# Patient Record
Sex: FEMALE | Race: WHITE | NOT HISPANIC OR LATINO | URBAN - METROPOLITAN AREA
[De-identification: names, ages, dates, MRNs, and addresses within clinical notes are randomized per-mention and may not be internally consistent; named-entity substitution may affect disease eponyms.]

---

## 2017-10-28 ENCOUNTER — EMERGENCY (EMERGENCY)
Facility: HOSPITAL | Age: 67
LOS: 1 days | Discharge: HOME | End: 2017-10-28
Attending: EMERGENCY MEDICINE
Payer: COMMERCIAL

## 2017-10-28 VITALS
DIASTOLIC BLOOD PRESSURE: 81 MMHG | TEMPERATURE: 98 F | HEART RATE: 110 BPM | RESPIRATION RATE: 18 BRPM | OXYGEN SATURATION: 96 % | SYSTOLIC BLOOD PRESSURE: 130 MMHG

## 2017-10-28 VITALS — WEIGHT: 233.91 LBS | HEIGHT: 64 IN

## 2017-10-28 DIAGNOSIS — Z96.7 PRESENCE OF OTHER BONE AND TENDON IMPLANTS: Chronic | ICD-10-CM

## 2017-10-28 PROCEDURE — 99284 EMERGENCY DEPT VISIT MOD MDM: CPT | Mod: 25

## 2017-10-28 PROCEDURE — 99283 EMERGENCY DEPT VISIT LOW MDM: CPT | Mod: 25

## 2017-10-28 PROCEDURE — 73080 X-RAY EXAM OF ELBOW: CPT | Mod: 26,RT

## 2017-10-28 PROCEDURE — 29105 APPLICATION LONG ARM SPLINT: CPT

## 2017-10-28 PROCEDURE — 29105 APPLICATION LONG ARM SPLINT: CPT | Mod: RT

## 2017-10-28 PROCEDURE — 73080 X-RAY EXAM OF ELBOW: CPT

## 2017-10-28 RX ORDER — IBUPROFEN 200 MG
1 TABLET ORAL
Qty: 15 | Refills: 0 | OUTPATIENT
Start: 2017-10-28 | End: 2017-11-02

## 2017-10-28 RX ORDER — LISINOPRIL 2.5 MG/1
0 TABLET ORAL
Qty: 0 | Refills: 0 | COMMUNITY

## 2017-10-28 NOTE — ED STATDOCS - OBJECTIVE STATEMENT
CC: R arm pain  Presenting symptoms: 66 y/o F presents to ED c/o R elbow pain onset today s/p mechanical fall. Pt fell on cement today s/p tripping on the curb; she stopped her fall with the FOOSH mechanism; she then turned over on her R arm. Denies smoking, and very little ETOH use.   Pertinent Positives: +R arm pain  Pertinent Negatives: Denies fever, chills, N/V/D, HA, weakness, numbness, back pain, neck pain, shoulder pain  Timing: today  Quality: achy  Radiation: none  Severity: mild  Aggravating Factors: TTP  Relieving Factors: none

## 2017-10-28 NOTE — ED STATDOCS - PHYSICAL EXAMINATION
Constitutional: Alert, NAD.   ENT: Airway patent. Nose clear. Mouth with normal mucosa.   Head: Atraumatic.   Eyes: Clear bilaterally. PERRL.   Cardiac: Normal rate.   Respiratory: Breath sounds clear bilaterally.   GI: Abdomen soft, non-tender, no guarding.   : No CVA or bladder tenderness.   Musculoskeletal: FROM. tenderness to R elbow.  Neuro: alert and oriented, no focal deficits, no motor or sensory deficits.   Skin: Dry, intact, no rash.   Psych: normal mood and affect.

## 2017-10-28 NOTE — ED STATDOCS - ATTENDING CONTRIBUTION TO CARE
I, Da Donohue, performed the initial face to face bedside interview with this patient regarding history of present illness, review of symptoms and relevant past medical, social and family history.  I completed an independent physical examination.  I was the initial provider who evaluated this patient. I have signed out the follow up of any pending tests (i.e. labs, radiological studies) to the ACP.  I have communicated the patient’s plan of care and disposition with the ACP.

## 2017-10-28 NOTE — ED STATDOCS - NS ED ROS FT
+R elbow pain  no fever  no chest pain  no SOB  no abd pain  no HA  All other ROS negative except as per HPI

## 2017-10-28 NOTE — ED STATDOCS - PROGRESS NOTE DETAILS
NP NOTE:  68 y/o F tripped and FOOSH at 12 N.  She presents with c/o right elbow pain.  On Exam pain to distal humerus on palpation, full BHAVANA of elbow.  Await x-rays. NP NOTE:  x-ray with sail sign and anterior fat pad, posterior splint applied, RICE, ibuprofen and f/u Dr. Azul

## 2017-11-02 ENCOUNTER — APPOINTMENT (OUTPATIENT)
Dept: ORTHOPEDIC SURGERY | Facility: CLINIC | Age: 67
End: 2017-11-02
Payer: COMMERCIAL

## 2017-11-02 VITALS
SYSTOLIC BLOOD PRESSURE: 134 MMHG | BODY MASS INDEX: 40.46 KG/M2 | HEIGHT: 64 IN | WEIGHT: 237 LBS | HEART RATE: 98 BPM | DIASTOLIC BLOOD PRESSURE: 73 MMHG

## 2017-11-02 DIAGNOSIS — S52.124D NONDISPLACED FRACTURE OF HEAD OF RIGHT RADIUS, SUBSEQUENT ENCOUNTER FOR CLOSED FRACTURE WITH ROUTINE HEALING: ICD-10-CM

## 2017-11-02 PROCEDURE — 73080 X-RAY EXAM OF ELBOW: CPT | Mod: RT

## 2017-11-02 PROCEDURE — 24650 CLTX RDL HEAD/NCK FX WO MNPJ: CPT | Mod: RT

## 2017-11-02 PROCEDURE — 99203 OFFICE O/P NEW LOW 30 MIN: CPT | Mod: 57

## 2017-11-10 ENCOUNTER — APPOINTMENT (OUTPATIENT)
Dept: ORTHOPEDIC SURGERY | Facility: CLINIC | Age: 67
End: 2017-11-10

## 2017-11-10 VITALS
WEIGHT: 237 LBS | HEIGHT: 64 IN | SYSTOLIC BLOOD PRESSURE: 133 MMHG | BODY MASS INDEX: 40.46 KG/M2 | DIASTOLIC BLOOD PRESSURE: 85 MMHG | HEART RATE: 103 BPM

## 2017-11-24 ENCOUNTER — APPOINTMENT (OUTPATIENT)
Dept: ORTHOPEDIC SURGERY | Facility: CLINIC | Age: 67
End: 2017-11-24
Payer: COMMERCIAL

## 2017-11-24 VITALS
TEMPERATURE: 98.3 F | HEART RATE: 91 BPM | HEIGHT: 64 IN | WEIGHT: 235 LBS | BODY MASS INDEX: 40.12 KG/M2 | SYSTOLIC BLOOD PRESSURE: 144 MMHG | DIASTOLIC BLOOD PRESSURE: 83 MMHG

## 2017-11-24 DIAGNOSIS — S42.401A UNSPECIFIED FRACTURE OF LOWER END OF RIGHT HUMERUS, INITIAL ENCOUNTER FOR CLOSED FRACTURE: ICD-10-CM

## 2017-11-24 PROCEDURE — 73080 X-RAY EXAM OF ELBOW: CPT | Mod: RT

## 2017-11-24 PROCEDURE — 99024 POSTOP FOLLOW-UP VISIT: CPT

## 2017-11-24 RX ORDER — CYANOCOBALAMIN 1000 UG/ML
1000 INJECTION INTRAMUSCULAR; SUBCUTANEOUS
Qty: 1 | Refills: 0 | Status: ACTIVE | COMMUNITY
Start: 2017-06-08

## 2017-11-24 RX ORDER — FLUTICASONE PROPIONATE 50 UG/1
50 SPRAY, METERED NASAL
Qty: 16 | Refills: 0 | Status: ACTIVE | COMMUNITY
Start: 2017-10-03

## 2017-11-24 RX ORDER — PREDNISONE 10 MG/1
10 TABLET ORAL
Qty: 15 | Refills: 0 | Status: ACTIVE | COMMUNITY
Start: 2017-10-13

## 2017-11-24 RX ORDER — LISINOPRIL 30 MG/1
TABLET ORAL
Refills: 0 | Status: ACTIVE | COMMUNITY

## 2017-11-24 RX ORDER — AZITHROMYCIN 250 MG/1
250 TABLET, FILM COATED ORAL
Qty: 6 | Refills: 0 | Status: ACTIVE | COMMUNITY
Start: 2017-10-03

## 2017-11-24 RX ORDER — CEFDINIR 300 MG/1
300 CAPSULE ORAL
Qty: 20 | Refills: 0 | Status: ACTIVE | COMMUNITY
Start: 2017-10-13

## 2017-11-25 PROBLEM — S42.401A CLOSED FRACTURE OF RIGHT ELBOW, INITIAL ENCOUNTER: Status: ACTIVE | Noted: 2017-11-02

## 2017-11-25 PROBLEM — S52.124D CLOSED NONDISPLACED FRACTURE OF HEAD OF RIGHT RADIUS WITH ROUTINE HEALING, SUBSEQUENT ENCOUNTER: Status: ACTIVE | Noted: 2017-11-25

## 2017-12-14 ENCOUNTER — APPOINTMENT (OUTPATIENT)
Dept: ORTHOPEDIC SURGERY | Facility: CLINIC | Age: 67
End: 2017-12-14

## 2018-01-25 ENCOUNTER — APPOINTMENT (OUTPATIENT)
Dept: ORTHOPEDIC SURGERY | Facility: CLINIC | Age: 68
End: 2018-01-25

## 2018-10-12 PROBLEM — I10 ESSENTIAL (PRIMARY) HYPERTENSION: Chronic | Status: ACTIVE | Noted: 2017-10-28

## 2018-11-30 ENCOUNTER — APPOINTMENT (OUTPATIENT)
Dept: OBGYN | Facility: CLINIC | Age: 68
End: 2018-11-30
Payer: MEDICARE

## 2018-11-30 VITALS
HEIGHT: 64 IN | SYSTOLIC BLOOD PRESSURE: 125 MMHG | BODY MASS INDEX: 40.8 KG/M2 | DIASTOLIC BLOOD PRESSURE: 80 MMHG | WEIGHT: 239 LBS

## 2018-11-30 PROCEDURE — 82270 OCCULT BLOOD FECES: CPT

## 2018-11-30 PROCEDURE — G0101: CPT

## 2018-12-06 LAB — CYTOLOGY CVX/VAG DOC THIN PREP: NORMAL

## 2020-12-15 ENCOUNTER — APPOINTMENT (OUTPATIENT)
Dept: OBGYN | Facility: CLINIC | Age: 70
End: 2020-12-15
Payer: MEDICARE

## 2020-12-15 VITALS
WEIGHT: 256 LBS | BODY MASS INDEX: 43.71 KG/M2 | HEIGHT: 64 IN | DIASTOLIC BLOOD PRESSURE: 85 MMHG | SYSTOLIC BLOOD PRESSURE: 163 MMHG

## 2020-12-15 PROCEDURE — 82270 OCCULT BLOOD FECES: CPT

## 2020-12-15 PROCEDURE — G0101: CPT

## 2020-12-21 LAB — CYTOLOGY CVX/VAG DOC THIN PREP: ABNORMAL

## 2022-03-10 ENCOUNTER — APPOINTMENT (OUTPATIENT)
Dept: OBGYN | Facility: CLINIC | Age: 72
End: 2022-03-10
Payer: MEDICARE

## 2022-03-10 VITALS
HEIGHT: 64 IN | WEIGHT: 247 LBS | SYSTOLIC BLOOD PRESSURE: 140 MMHG | BODY MASS INDEX: 42.17 KG/M2 | DIASTOLIC BLOOD PRESSURE: 80 MMHG

## 2022-03-10 DIAGNOSIS — Z01.419 ENCOUNTER FOR GYNECOLOGICAL EXAMINATION (GENERAL) (ROUTINE) W/OUT ABNORMAL FINDINGS: ICD-10-CM

## 2022-03-10 DIAGNOSIS — Z12.39 ENCOUNTER FOR OTHER SCREENING FOR MALIGNANT NEOPLASM OF BREAST: ICD-10-CM

## 2022-03-10 PROCEDURE — 99213 OFFICE O/P EST LOW 20 MIN: CPT

## 2022-03-10 NOTE — HISTORY OF PRESENT ILLNESS
[FreeTextEntry1] : 72 yo pt here for gyn visit. wants screening for breast ca. denies vb, pel pain. \par dexa showed osteopenia.

## 2022-03-10 NOTE — PHYSICAL EXAM
[Appropriately responsive] : appropriately responsive [Alert] : alert [No Acute Distress] : no acute distress [No Lymphadenopathy] : no lymphadenopathy [Regular Rate Rhythm] : regular rate rhythm [No Murmurs] : no murmurs [Clear to Auscultation B/L] : clear to auscultation bilaterally [Soft] : soft [Non-tender] : non-tender [Non-distended] : non-distended [No HSM] : No HSM [No Lesions] : no lesions [No Mass] : no mass [Oriented x3] : oriented x3 [Examination Of The Breasts] : a normal appearance [No Masses] : no breast masses were palpable [Labia Majora] : normal [Labia Minora] : normal [Normal] : normal [Uterine Adnexae] : normal [No Tenderness] : no tenderness [FreeTextEntry9] : erik

## 2022-03-10 NOTE — DISCUSSION/SUMMARY
[FreeTextEntry1] : osteopenia- guerita, vit d , exercize. \par nl gyn exam, ref mammo\par spent 25 min in encounter.

## 2022-08-08 ENCOUNTER — NON-APPOINTMENT (OUTPATIENT)
Age: 72
End: 2022-08-08

## 2023-04-25 ENCOUNTER — APPOINTMENT (OUTPATIENT)
Dept: OBGYN | Facility: CLINIC | Age: 73
End: 2023-04-25
Payer: MEDICARE

## 2023-04-25 ENCOUNTER — NON-APPOINTMENT (OUTPATIENT)
Age: 73
End: 2023-04-25

## 2023-04-25 VITALS
SYSTOLIC BLOOD PRESSURE: 135 MMHG | DIASTOLIC BLOOD PRESSURE: 83 MMHG | BODY MASS INDEX: 42.17 KG/M2 | HEIGHT: 64 IN | WEIGHT: 247 LBS

## 2023-04-25 DIAGNOSIS — Z00.00 ENCOUNTER FOR GENERAL ADULT MEDICAL EXAMINATION W/OUT ABNORMAL FINDINGS: ICD-10-CM

## 2023-04-25 PROCEDURE — G0101: CPT

## 2023-04-25 NOTE — HISTORY OF PRESENT ILLNESS
[FreeTextEntry1] : 73 yo pt here for gyn visit. wants screening for breast ca. denies vb, pel pain. \par dexa showed osteopenia.

## 2023-05-03 LAB — CYTOLOGY CVX/VAG DOC THIN PREP: ABNORMAL

## 2024-03-22 ENCOUNTER — OFFICE (OUTPATIENT)
Dept: URBAN - METROPOLITAN AREA CLINIC 113 | Facility: CLINIC | Age: 74
Setting detail: OPHTHALMOLOGY
End: 2024-03-22
Payer: MEDICARE

## 2024-03-22 DIAGNOSIS — H25.13: ICD-10-CM

## 2024-03-22 DIAGNOSIS — H43.811: ICD-10-CM

## 2024-03-22 DIAGNOSIS — H17.9: ICD-10-CM

## 2024-03-22 DIAGNOSIS — H25.011: ICD-10-CM

## 2024-03-22 DIAGNOSIS — H40.013: ICD-10-CM

## 2024-03-22 PROCEDURE — 92014 COMPRE OPH EXAM EST PT 1/>: CPT | Performed by: OPHTHALMOLOGY

## 2024-03-22 PROCEDURE — 92250 FUNDUS PHOTOGRAPHY W/I&R: CPT | Performed by: OPHTHALMOLOGY

## 2024-03-22 ASSESSMENT — REFRACTION_CURRENTRX
OD_CYLINDER: -2.25
OS_CYLINDER: -1.25
OS_AXIS: 075
OD_ADD: +2.00
OD_OVR_VA: 20/
OS_OVR_VA: 20/
OS_VPRISM_DIRECTION: PROGS
OD_VPRISM_DIRECTION: PROGS
OD_AXIS: 069
OS_ADD: +2.00
OS_SPHERE: -0.75
OD_SPHERE: +2.25

## 2024-03-22 ASSESSMENT — SPHEQUIV_DERIVED
OD_SPHEQUIV: -2
OS_SPHEQUIV: -0.25

## 2024-03-22 ASSESSMENT — REFRACTION_MANIFEST
OS_SPHERE: +0.50
OD_ADD: +3.00
OD_VA1: 20/40
OS_CYLINDER: -1.50
OD_CYLINDER: -1.00
OS_ADD: +3.00
OS_AXIS: 083
OD_AXIS: 082
OS_VA1: 20/25
OD_SPHERE: -1.50

## 2024-05-17 DIAGNOSIS — Z13.820 ENCOUNTER FOR SCREENING FOR OSTEOPOROSIS: ICD-10-CM

## 2024-05-21 ENCOUNTER — APPOINTMENT (OUTPATIENT)
Dept: OBGYN | Facility: CLINIC | Age: 74
End: 2024-05-21
Payer: MEDICARE

## 2024-05-21 VITALS
HEIGHT: 64 IN | DIASTOLIC BLOOD PRESSURE: 72 MMHG | HEART RATE: 80 BPM | BODY MASS INDEX: 38.41 KG/M2 | SYSTOLIC BLOOD PRESSURE: 126 MMHG | WEIGHT: 225 LBS

## 2024-05-21 DIAGNOSIS — M85.80 OTHER SPECIFIED DISORDERS OF BONE DENSITY AND STRUCTURE, UNSPECIFIED SITE: ICD-10-CM

## 2024-05-21 DIAGNOSIS — Z78.0 ASYMPTOMATIC MENOPAUSAL STATE: ICD-10-CM

## 2024-05-21 PROCEDURE — 99214 OFFICE O/P EST MOD 30 MIN: CPT

## 2024-05-21 RX ORDER — ALENDRONATE SODIUM 70 MG/1
70 TABLET ORAL
Qty: 4 | Refills: 12 | Status: ACTIVE | COMMUNITY
Start: 2024-05-21 | End: 1900-01-01

## 2024-05-22 NOTE — HISTORY OF PRESENT ILLNESS
[FreeTextEntry1] : 74 yo pt here for gyn exam on monjiaro for elevated a1c, lost 20 lbs, a1c improved.  no change in meds.  denies love hassan, pel pain.

## 2024-05-22 NOTE — DISCUSSION/SUMMARY
[FreeTextEntry1] : We reviewed the patient's bone density from last year which suggested moderate risk of fracture and recommended treatment.  We discussed the medications that are available for treatment of osteopenia and osteoporosis, including bisphosphonates, raloxifene and injectables.   Rec calcium , vit  D, exercize, fall prevention. A prescription for alendronate is called in and the patient is going to start with that and will follow-up in a year or as needed

## 2024-12-02 ENCOUNTER — OFFICE (OUTPATIENT)
Dept: URBAN - METROPOLITAN AREA CLINIC 12 | Facility: CLINIC | Age: 74
Setting detail: OPHTHALMOLOGY
End: 2024-12-02
Payer: MEDICARE

## 2024-12-02 DIAGNOSIS — H35.3131: ICD-10-CM

## 2024-12-02 DIAGNOSIS — H35.373: ICD-10-CM

## 2024-12-02 DIAGNOSIS — H40.013: ICD-10-CM

## 2024-12-02 DIAGNOSIS — H43.811: ICD-10-CM

## 2024-12-02 DIAGNOSIS — H25.13: ICD-10-CM

## 2024-12-02 DIAGNOSIS — H25.011: ICD-10-CM

## 2024-12-02 DIAGNOSIS — H17.9: ICD-10-CM

## 2024-12-02 PROCEDURE — 92133 CPTRZD OPH DX IMG PST SGM ON: CPT | Performed by: OPHTHALMOLOGY

## 2024-12-02 PROCEDURE — 92083 EXTENDED VISUAL FIELD XM: CPT | Performed by: OPHTHALMOLOGY

## 2024-12-02 PROCEDURE — 99214 OFFICE O/P EST MOD 30 MIN: CPT | Performed by: OPHTHALMOLOGY

## 2024-12-02 PROCEDURE — 92134 CPTRZ OPH DX IMG PST SGM RTA: CPT | Performed by: OPHTHALMOLOGY

## 2024-12-02 PROCEDURE — 92250 FUNDUS PHOTOGRAPHY W/I&R: CPT | Performed by: OPHTHALMOLOGY

## 2024-12-02 ASSESSMENT — CONFRONTATIONAL VISUAL FIELD TEST (CVF)
OD_FINDINGS: FULL
OS_FINDINGS: FULL

## 2024-12-02 ASSESSMENT — PACHYMETRY
OD_CT_UM: 518
OS_CT_UM: 509
OS_CT_CORRECTION: 3
OD_CT_CORRECTION: 2

## 2024-12-02 ASSESSMENT — TONOMETRY
OD_IOP_MMHG: 17
OS_IOP_MMHG: 16

## 2024-12-02 ASSESSMENT — CORNEAL SURGICAL SCARRING: OD_SCARRING: STROMAL

## 2024-12-04 ASSESSMENT — REFRACTION_MANIFEST
OD_CYLINDER: -2.25
OD_VA1: 20/40
OD_CYLINDER: -1.00
OD_SPHERE: -1.50
OS_AXIS: 080
OD_SPHERE: -5.25
OS_VA1: 20/25
OS_SPHERE: +0.50
OS_CYLINDER: -1.50
OS_CYLINDER: -1.50
OD_VA1: 20/NI
OS_SPHERE: +0.50
OD_AXIS: 082
OS_ADD: +3.00
OD_ADD: +3.00
OD_AXIS: 150
OS_AXIS: 083
OS_VA1: 20/30-

## 2024-12-04 ASSESSMENT — REFRACTION_CURRENTRX
OD_SPHERE: +2.25
OS_OVR_VA: 20/
OS_SPHERE: -0.25
OD_AXIS: 069
OS_CYLINDER: -1.25
OD_ADD: +2.00
OD_CYLINDER: -1.25
OS_AXIS: 075
OD_CYLINDER: -2.25
OS_ADD: +2.75
OS_CYLINDER: -1.25
OS_VPRISM_DIRECTION: PROGS
OS_AXIS: 079
OS_SPHERE: -0.75
OD_VPRISM_DIRECTION: PROGS
OD_AXIS: 082
OD_VPRISM_DIRECTION: PROGS
OD_SPHERE: +1.25
OD_OVR_VA: 20/
OS_ADD: +2.00
OS_OVR_VA: 20/
OD_ADD: +2.50
OD_OVR_VA: 20/
OS_VPRISM_DIRECTION: PROGS

## 2024-12-04 ASSESSMENT — KERATOMETRY
OS_AXISANGLE_DEGREES: 168
OD_K2POWER_DIOPTERS: 46.75
OS_K2POWER_DIOPTERS: 45.00
OD_K1POWER_DIOPTERS: 45.00
OS_K1POWER_DIOPTERS: 44.75
OD_AXISANGLE_DEGREES: 156

## 2024-12-04 ASSESSMENT — REFRACTION_AUTOREFRACTION
OS_CYLINDER: -1.50
OD_CYLINDER: -2.25
OD_SPHERE: -5.00
OS_AXIS: 077
OS_SPHERE: +0.75
OD_AXIS: 148

## 2024-12-04 ASSESSMENT — VISUAL ACUITY
OS_BCVA: 20/100-
OD_BCVA: 20/40

## 2025-02-04 ENCOUNTER — ASC (OUTPATIENT)
Dept: URBAN - METROPOLITAN AREA SURGERY 8 | Facility: SURGERY | Age: 75
Setting detail: OPHTHALMOLOGY
End: 2025-02-04
Payer: MEDICARE

## 2025-02-04 DIAGNOSIS — H52.221: ICD-10-CM

## 2025-02-04 DIAGNOSIS — H25.11: ICD-10-CM

## 2025-02-04 PROCEDURE — 68841 INSJ RX ELUT IMPLT LAC CANAL: CPT | Mod: RT | Performed by: OPHTHALMOLOGY

## 2025-02-04 PROCEDURE — FEMTO PRECISION LASER CATARACT SURGERY: Mod: GY,RT | Performed by: OPHTHALMOLOGY

## 2025-02-04 PROCEDURE — 66984 XCAPSL CTRC RMVL W/O ECP: CPT | Mod: RT | Performed by: OPHTHALMOLOGY

## 2025-02-04 PROCEDURE — V2787 ASTIGMATISM-CORRECT FUNCTION: HCPCS | Mod: RT | Performed by: OPHTHALMOLOGY

## 2025-02-04 PROCEDURE — S9986 NOT MEDICALLY NECESSARY SVC: HCPCS | Mod: GX,GY | Performed by: OPHTHALMOLOGY

## 2025-02-05 ENCOUNTER — OFFICE (OUTPATIENT)
Dept: URBAN - METROPOLITAN AREA CLINIC 12 | Facility: CLINIC | Age: 75
Setting detail: OPHTHALMOLOGY
End: 2025-02-05
Payer: MEDICARE

## 2025-02-05 ENCOUNTER — RX ONLY (RX ONLY)
Age: 75
End: 2025-02-05

## 2025-02-05 DIAGNOSIS — Z96.1: ICD-10-CM

## 2025-02-05 PROCEDURE — 99024 POSTOP FOLLOW-UP VISIT: CPT | Performed by: OPTOMETRIST

## 2025-02-05 ASSESSMENT — REFRACTION_MANIFEST
OD_VA1: 20/NI
OS_CYLINDER: -1.50
OD_AXIS: 082
OD_CYLINDER: -1.00
OS_AXIS: 083
OS_VA1: 20/30-
OS_SPHERE: +0.50
OD_SPHERE: -1.50
OS_VA1: 20/25
OD_VA1: 20/40
OS_AXIS: 080
OS_CYLINDER: -1.50
OD_AXIS: 150
OS_ADD: +3.00
OD_SPHERE: -5.25
OD_ADD: +3.00
OS_SPHERE: +0.50
OD_CYLINDER: -2.25

## 2025-02-05 ASSESSMENT — VISUAL ACUITY
OS_BCVA: 20/60+1
OD_BCVA: 20/25

## 2025-02-05 ASSESSMENT — TONOMETRY
OS_IOP_MMHG: 17
OD_IOP_MMHG: 21

## 2025-02-05 ASSESSMENT — REFRACTION_AUTOREFRACTION
OS_CYLINDER: -1.25
OD_AXIS: 134
OS_AXIS: 078
OD_CYLINDER: -0.50
OS_SPHERE: +0.75
OD_SPHERE: PLANO

## 2025-02-05 ASSESSMENT — PACHYMETRY
OS_CT_UM: 509
OS_CT_CORRECTION: 3
OD_CT_UM: 518
OD_CT_CORRECTION: 2

## 2025-02-05 ASSESSMENT — REFRACTION_CURRENTRX
OS_VPRISM_DIRECTION: PROGS
OS_ADD: +2.75
OS_AXIS: 075
OS_CYLINDER: -1.25
OD_SPHERE: +1.25
OS_OVR_VA: 20/
OD_VPRISM_DIRECTION: PROGS
OS_SPHERE: -0.75
OS_SPHERE: -0.25
OD_CYLINDER: -2.25
OS_OVR_VA: 20/
OD_ADD: +2.00
OD_OVR_VA: 20/
OD_AXIS: 069
OS_VPRISM_DIRECTION: PROGS
OS_CYLINDER: -1.25
OD_AXIS: 082
OD_OVR_VA: 20/
OD_SPHERE: +2.25
OD_CYLINDER: -1.25
OS_ADD: +2.00
OD_VPRISM_DIRECTION: PROGS
OS_AXIS: 079
OD_ADD: +2.50

## 2025-02-05 ASSESSMENT — KERATOMETRY
OS_K2POWER_DIOPTERS: 45.00
OD_K2POWER_DIOPTERS: 45.75
OS_AXISANGLE_DEGREES: 172
OD_K1POWER_DIOPTERS: 41.00
OS_K1POWER_DIOPTERS: 44.50
OD_AXISANGLE_DEGREES: 012

## 2025-02-05 ASSESSMENT — CORNEAL SURGICAL SCARRING: OD_SCARRING: STROMAL

## 2025-02-05 ASSESSMENT — CONFRONTATIONAL VISUAL FIELD TEST (CVF)
OD_FINDINGS: FULL
OS_FINDINGS: FULL

## 2025-02-12 ENCOUNTER — OFFICE (OUTPATIENT)
Dept: URBAN - METROPOLITAN AREA CLINIC 12 | Facility: CLINIC | Age: 75
Setting detail: OPHTHALMOLOGY
End: 2025-02-12
Payer: MEDICARE

## 2025-02-12 DIAGNOSIS — H25.12: ICD-10-CM

## 2025-02-12 PROCEDURE — 92136 OPHTHALMIC BIOMETRY: CPT | Mod: 26,LT | Performed by: OPHTHALMOLOGY

## 2025-02-12 ASSESSMENT — REFRACTION_MANIFEST
OD_ADD: +3.00
OS_VA1: 20/30-
OD_CYLINDER: -2.25
OS_SPHERE: +0.50
OD_AXIS: 150
OD_SPHERE: -1.50
OS_CYLINDER: -1.50
OS_AXIS: 080
OD_CYLINDER: -1.00
OD_VA1: 20/40
OS_AXIS: 083
OS_VA1: 20/25
OS_ADD: +3.00
OS_CYLINDER: -1.50
OD_AXIS: 082
OD_SPHERE: -5.25
OS_SPHERE: +0.50
OD_VA1: 20/NI

## 2025-02-12 ASSESSMENT — CONFRONTATIONAL VISUAL FIELD TEST (CVF)
OS_FINDINGS: FULL
OD_FINDINGS: FULL

## 2025-02-12 ASSESSMENT — REFRACTION_CURRENTRX
OS_VPRISM_DIRECTION: PROGS
OS_AXIS: 075
OD_SPHERE: +1.25
OD_SPHERE: +2.25
OD_ADD: +2.00
OS_CYLINDER: -1.25
OS_AXIS: 079
OS_OVR_VA: 20/
OD_VPRISM_DIRECTION: PROGS
OD_ADD: +2.50
OD_CYLINDER: -1.25
OS_ADD: +2.75
OD_AXIS: 082
OD_OVR_VA: 20/
OD_CYLINDER: -2.25
OS_SPHERE: -0.25
OS_CYLINDER: -1.25
OD_VPRISM_DIRECTION: PROGS
OD_AXIS: 069
OS_ADD: +2.00
OS_VPRISM_DIRECTION: PROGS
OS_OVR_VA: 20/
OS_SPHERE: -0.75
OD_OVR_VA: 20/

## 2025-02-12 ASSESSMENT — REFRACTION_AUTOREFRACTION
OS_SPHERE: +0.50
OD_SPHERE: +0.50
OS_AXIS: 075
OD_CYLINDER: -0.75
OD_AXIS: 073
OS_CYLINDER: -1.75

## 2025-02-12 ASSESSMENT — KERATOMETRY
OD_AXISANGLE_DEGREES: 166
OD_K2POWER_DIOPTERS: 46.00
OD_K1POWER_DIOPTERS: 44.00
OS_K2POWER_DIOPTERS: 45.25
OS_K1POWER_DIOPTERS: 44.25
OS_AXISANGLE_DEGREES: 167

## 2025-02-12 ASSESSMENT — TONOMETRY
OD_IOP_MMHG: 16
OS_IOP_MMHG: 11

## 2025-02-12 ASSESSMENT — PACHYMETRY
OD_CT_UM: 518
OD_CT_CORRECTION: 2
OS_CT_UM: 509
OS_CT_CORRECTION: 3

## 2025-02-12 ASSESSMENT — VISUAL ACUITY
OD_BCVA: 20/30-
OS_BCVA: 20/30-

## 2025-02-12 ASSESSMENT — CORNEAL SURGICAL SCARRING: OD_SCARRING: STROMAL

## 2025-02-25 ENCOUNTER — ASC (OUTPATIENT)
Dept: URBAN - METROPOLITAN AREA SURGERY 8 | Facility: SURGERY | Age: 75
Setting detail: OPHTHALMOLOGY
End: 2025-02-25
Payer: MEDICARE

## 2025-02-25 DIAGNOSIS — H52.222: ICD-10-CM

## 2025-02-25 DIAGNOSIS — H25.12: ICD-10-CM

## 2025-02-25 PROCEDURE — S9986 NOT MEDICALLY NECESSARY SVC: HCPCS | Mod: GX,GY | Performed by: OPHTHALMOLOGY

## 2025-02-25 PROCEDURE — FEMTO PRECISION LASER CATARACT SURGERY: Mod: GY | Performed by: OPHTHALMOLOGY

## 2025-02-25 PROCEDURE — V2787 ASTIGMATISM-CORRECT FUNCTION: HCPCS | Performed by: OPHTHALMOLOGY

## 2025-02-25 PROCEDURE — 68841 INSJ RX ELUT IMPLT LAC CANAL: CPT | Mod: 79,LT | Performed by: OPHTHALMOLOGY

## 2025-02-25 PROCEDURE — 66984 XCAPSL CTRC RMVL W/O ECP: CPT | Mod: 79,LT | Performed by: OPHTHALMOLOGY

## 2025-02-26 ENCOUNTER — RX ONLY (RX ONLY)
Age: 75
End: 2025-02-26

## 2025-02-26 ENCOUNTER — OFFICE (OUTPATIENT)
Dept: URBAN - METROPOLITAN AREA CLINIC 12 | Facility: CLINIC | Age: 75
Setting detail: OPHTHALMOLOGY
End: 2025-02-26
Payer: MEDICARE

## 2025-02-26 DIAGNOSIS — Z96.1: ICD-10-CM

## 2025-02-26 PROBLEM — H25.12 CATARACT; LEFT EYE: Status: RESOLVED | Noted: 2025-02-05 | Resolved: 2025-02-26

## 2025-02-26 PROCEDURE — 99024 POSTOP FOLLOW-UP VISIT: CPT | Performed by: OPTOMETRIST

## 2025-02-26 ASSESSMENT — REFRACTION_CURRENTRX
OS_AXIS: 075
OD_SPHERE: +2.25
OD_ADD: +2.50
OS_VPRISM_DIRECTION: PROGS
OD_CYLINDER: -2.25
OS_ADD: +2.75
OS_SPHERE: -0.75
OD_AXIS: 069
OD_OVR_VA: 20/
OD_AXIS: 082
OS_AXIS: 079
OD_CYLINDER: -1.25
OS_CYLINDER: -1.25
OD_ADD: +2.00
OD_VPRISM_DIRECTION: PROGS
OS_OVR_VA: 20/
OS_ADD: +2.00
OS_OVR_VA: 20/
OD_VPRISM_DIRECTION: PROGS
OD_SPHERE: +1.25
OS_CYLINDER: -1.25
OS_SPHERE: -0.25
OD_OVR_VA: 20/
OS_VPRISM_DIRECTION: PROGS

## 2025-02-26 ASSESSMENT — VISUAL ACUITY
OD_BCVA: 20/25-3
OS_BCVA: 20/25-1

## 2025-02-26 ASSESSMENT — REFRACTION_MANIFEST
OS_CYLINDER: -1.50
OD_SPHERE: -1.50
OS_VA1: 20/25
OS_SPHERE: +0.50
OD_SPHERE: -5.25
OS_ADD: +3.00
OD_ADD: +3.00
OD_CYLINDER: -1.00
OD_VA1: 20/NI
OD_CYLINDER: -2.25
OD_VA1: 20/40
OS_SPHERE: +0.50
OS_CYLINDER: -1.50
OS_AXIS: 080
OD_AXIS: 150
OS_AXIS: 083
OS_VA1: 20/30-
OD_AXIS: 082

## 2025-02-26 ASSESSMENT — PACHYMETRY
OS_CT_CORRECTION: 3
OD_CT_CORRECTION: 2
OD_CT_UM: 518
OS_CT_UM: 509

## 2025-02-26 ASSESSMENT — REFRACTION_AUTOREFRACTION
OS_CYLINDER: -0.25
OS_AXIS: 116
OD_SPHERE: +0.50
OD_AXIS: 090
OS_SPHERE: -0.25
OD_CYLINDER: -0.25

## 2025-02-26 ASSESSMENT — TONOMETRY
OS_IOP_MMHG: 19
OD_IOP_MMHG: 15

## 2025-02-26 ASSESSMENT — KERATOMETRY
OD_AXISANGLE_DEGREES: 170
OD_K2POWER_DIOPTERS: 46.25
OS_K1POWER_DIOPTERS: 44.50
OS_AXISANGLE_DEGREES: 179
OS_K2POWER_DIOPTERS: 45.25
OD_K1POWER_DIOPTERS: 44.50

## 2025-02-26 ASSESSMENT — CORNEAL SURGICAL SCARRING: OD_SCARRING: STROMAL

## 2025-02-26 ASSESSMENT — CONFRONTATIONAL VISUAL FIELD TEST (CVF)
OS_FINDINGS: FULL
OD_FINDINGS: FULL

## 2025-03-04 ENCOUNTER — OFFICE (OUTPATIENT)
Dept: URBAN - METROPOLITAN AREA CLINIC 12 | Facility: CLINIC | Age: 75
Setting detail: OPHTHALMOLOGY
End: 2025-03-04
Payer: MEDICARE

## 2025-03-04 DIAGNOSIS — Z96.1: ICD-10-CM

## 2025-03-04 PROCEDURE — 99024 POSTOP FOLLOW-UP VISIT: CPT | Performed by: OPTOMETRIST

## 2025-03-04 ASSESSMENT — REFRACTION_CURRENTRX
OD_OVR_VA: 20/
OS_OVR_VA: 20/
OS_AXIS: 079
OS_AXIS: 075
OD_AXIS: 069
OS_CYLINDER: -1.25
OD_VPRISM_DIRECTION: PROGS
OS_SPHERE: -0.25
OS_VPRISM_DIRECTION: PROGS
OD_SPHERE: +1.25
OS_CYLINDER: -1.25
OD_ADD: +2.00
OD_ADD: +2.50
OS_ADD: +2.00
OS_OVR_VA: 20/
OD_AXIS: 082
OS_SPHERE: -0.75
OD_OVR_VA: 20/
OS_VPRISM_DIRECTION: PROGS
OD_VPRISM_DIRECTION: PROGS
OD_CYLINDER: -1.25
OS_ADD: +2.75
OD_CYLINDER: -2.25
OD_SPHERE: +2.25

## 2025-03-04 ASSESSMENT — VISUAL ACUITY
OD_BCVA: 20/30+2
OS_BCVA: 20/25

## 2025-03-04 ASSESSMENT — KERATOMETRY
OD_AXISANGLE_DEGREES: 155
OD_K2POWER_DIOPTERS: 46.25
OS_K2POWER_DIOPTERS: 45.25
OD_K1POWER_DIOPTERS: 44.75
OS_AXISANGLE_DEGREES: 168
OS_K1POWER_DIOPTERS: 44.75

## 2025-03-04 ASSESSMENT — REFRACTION_MANIFEST
OS_CYLINDER: -1.50
OS_VA1: 20/25
OD_ADD: +3.00
OS_AXIS: 083
OD_VA1: 20/40
OD_CYLINDER: -1.00
OD_SPHERE: -1.50
OS_AXIS: 080
OS_SPHERE: +0.50
OS_ADD: +3.00
OS_SPHERE: +0.50
OD_VA1: 20/NI
OS_CYLINDER: -1.50
OD_CYLINDER: -2.25
OS_VA1: 20/30-
OD_AXIS: 082
OD_SPHERE: -5.25
OD_AXIS: 150

## 2025-03-04 ASSESSMENT — CONFRONTATIONAL VISUAL FIELD TEST (CVF)
OD_FINDINGS: FULL
OS_FINDINGS: FULL

## 2025-03-04 ASSESSMENT — PACHYMETRY
OS_CT_CORRECTION: 3
OD_CT_UM: 518
OD_CT_CORRECTION: 2
OS_CT_UM: 509

## 2025-03-04 ASSESSMENT — CORNEAL SURGICAL SCARRING: OD_SCARRING: STROMAL

## 2025-03-04 ASSESSMENT — REFRACTION_AUTOREFRACTION
OS_SPHERE: -0.50
OD_SPHERE: +0.25
OS_CYLINDER: -0.25
OS_AXIS: 029
OD_AXIS: 034
OD_CYLINDER: -0.75

## 2025-03-04 ASSESSMENT — TONOMETRY
OD_IOP_MMHG: 13
OS_IOP_MMHG: 15

## 2025-03-25 ENCOUNTER — OFFICE (OUTPATIENT)
Dept: URBAN - METROPOLITAN AREA CLINIC 12 | Facility: CLINIC | Age: 75
Setting detail: OPHTHALMOLOGY
End: 2025-03-25
Payer: MEDICARE

## 2025-03-25 DIAGNOSIS — H52.4: ICD-10-CM

## 2025-03-25 DIAGNOSIS — H52.7: ICD-10-CM

## 2025-03-25 DIAGNOSIS — H02.011: ICD-10-CM

## 2025-03-25 DIAGNOSIS — Z96.1: ICD-10-CM

## 2025-03-25 PROCEDURE — 92015 DETERMINE REFRACTIVE STATE: CPT | Performed by: OPTOMETRIST

## 2025-03-25 PROCEDURE — 99024 POSTOP FOLLOW-UP VISIT: CPT | Performed by: OPTOMETRIST

## 2025-03-25 PROCEDURE — 67820 REVISE EYELASHES: CPT | Performed by: OPTOMETRIST

## 2025-03-25 ASSESSMENT — REFRACTION_AUTOREFRACTION
OD_CYLINDER: -0.75
OS_SPHERE: -0.75
OD_SPHERE: PLANO
OS_AXIS: 036
OD_AXIS: 126
OS_CYLINDER: -0.25

## 2025-03-25 ASSESSMENT — REFRACTION_MANIFEST
OS_SPHERE: +0.50
OS_CYLINDER: -1.50
OD_SPHERE: -1.50
OS_CYLINDER: SPH
OD_VA1: 20/40
OD_ADD: +2.50
OD_AXIS: 150
OS_VA1: 20/30-
OS_VA1: 20/25
OD_AXIS: 125
OS_SPHERE: +0.50
OS_ADD: +3.00
OD_CYLINDER: -0.50
OD_SPHERE: -0.25
OD_VA1: 20/NI
OD_ADD: +3.00
OD_SPHERE: -5.25
OD_CYLINDER: -2.25
OD_AXIS: 082
OD_CYLINDER: -1.00
OS_SPHERE: -0.25
OS_CYLINDER: -1.50
OD_VA1: 20/20
OS_AXIS: 083
OS_AXIS: 080
OS_ADD: +2.50
OS_VA1: 20/25

## 2025-03-25 ASSESSMENT — CORNEAL SURGICAL SCARRING: OD_SCARRING: STROMAL

## 2025-03-25 ASSESSMENT — REFRACTION_CURRENTRX
OD_SPHERE: +2.25
OS_OVR_VA: 20/
OD_ADD: +2.00
OS_SPHERE: -0.25
OD_OVR_VA: 20/
OS_VPRISM_DIRECTION: PROGS
OD_VPRISM_DIRECTION: PROGS
OS_ADD: +2.75
OS_OVR_VA: 20/
OD_CYLINDER: -2.25
OD_OVR_VA: 20/
OD_AXIS: 069
OD_SPHERE: +1.25
OD_VPRISM_DIRECTION: PROGS
OS_SPHERE: -0.75
OS_CYLINDER: -1.25
OS_AXIS: 079
OS_AXIS: 075
OD_AXIS: 082
OS_CYLINDER: -1.25
OS_ADD: +2.00
OD_CYLINDER: -1.25
OD_ADD: +2.50
OS_VPRISM_DIRECTION: PROGS

## 2025-03-25 ASSESSMENT — LID EXAM ASSESSMENTS: OD_TRICHIASIS: RUL

## 2025-03-25 ASSESSMENT — PACHYMETRY
OS_CT_UM: 509
OS_CT_CORRECTION: 3
OD_CT_CORRECTION: 2
OD_CT_UM: 518

## 2025-03-25 ASSESSMENT — KERATOMETRY
OD_AXISANGLE_DEGREES: 176
OS_K2POWER_DIOPTERS: 45.50
OS_AXISANGLE_DEGREES: 167
OD_K2POWER_DIOPTERS: 46.75
OS_K1POWER_DIOPTERS: 44.75
OD_K1POWER_DIOPTERS: 44.75

## 2025-03-25 ASSESSMENT — VISUAL ACUITY
OD_BCVA: 20/25-2
OS_BCVA: 20/25-2

## 2025-03-25 ASSESSMENT — TONOMETRY
OD_IOP_MMHG: 12
OS_IOP_MMHG: 11

## 2025-03-25 ASSESSMENT — CONFRONTATIONAL VISUAL FIELD TEST (CVF)
OD_FINDINGS: FULL
OS_FINDINGS: FULL

## 2025-05-27 ENCOUNTER — APPOINTMENT (OUTPATIENT)
Dept: OBGYN | Facility: CLINIC | Age: 75
End: 2025-05-27
Payer: MEDICARE

## 2025-05-27 VITALS
DIASTOLIC BLOOD PRESSURE: 78 MMHG | SYSTOLIC BLOOD PRESSURE: 124 MMHG | WEIGHT: 219 LBS | BODY MASS INDEX: 37.39 KG/M2 | HEIGHT: 64 IN

## 2025-05-27 DIAGNOSIS — Z00.00 ENCOUNTER FOR GENERAL ADULT MEDICAL EXAMINATION W/OUT ABNORMAL FINDINGS: ICD-10-CM

## 2025-05-27 PROCEDURE — G0101: CPT

## 2025-06-03 LAB — CYTOLOGY CVX/VAG DOC THIN PREP: ABNORMAL
